# Patient Record
Sex: FEMALE | Race: WHITE
[De-identification: names, ages, dates, MRNs, and addresses within clinical notes are randomized per-mention and may not be internally consistent; named-entity substitution may affect disease eponyms.]

---
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
## 2019-01-29 ENCOUNTER — HOSPITAL ENCOUNTER (EMERGENCY)
Dept: HOSPITAL 74 - FER | Age: 19
Discharge: HOME | End: 2019-01-29
Payer: COMMERCIAL

## 2019-01-29 VITALS — TEMPERATURE: 98.6 F | HEART RATE: 75 BPM | SYSTOLIC BLOOD PRESSURE: 113 MMHG | DIASTOLIC BLOOD PRESSURE: 72 MMHG

## 2019-01-29 VITALS — BODY MASS INDEX: 37.8 KG/M2

## 2019-01-29 DIAGNOSIS — K52.9: Primary | ICD-10-CM

## 2019-01-29 LAB
ALBUMIN SERPL-MCNC: 4.1 G/DL (ref 3.4–5)
ALP SERPL-CCNC: 76 U/L (ref 45–117)
ALT SERPL-CCNC: 44 U/L (ref 13–61)
ANION GAP SERPL CALC-SCNC: 7 MMOL/L (ref 8–16)
APPEARANCE UR: (no result)
AST SERPL-CCNC: 24 U/L (ref 15–37)
BACTERIA #/AREA URNS HPF: (no result) /HPF
BASOPHILS # BLD: 0.9 % (ref 0–2)
BILIRUB SERPL-MCNC: 0.3 MG/DL (ref 0.2–1)
BUN SERPL-MCNC: 11 MG/DL (ref 7–18)
CALCIUM SERPL-MCNC: 9.3 MG/DL (ref 8.5–10)
CHLORIDE SERPL-SCNC: 105 MMOL/L (ref 98–107)
CO2 SERPL-SCNC: 25 MMOL/L (ref 21–32)
COLOR UR: (no result)
CREAT SERPL-MCNC: < 0.6 MG/DL (ref 0.55–1.3)
DEPRECATED RDW RBC AUTO: 12.7 % (ref 11.6–15.6)
EOSINOPHIL # BLD: 1.1 % (ref 0–4.5)
EPITH CASTS URNS QL MICRO: (no result) /HPF
GLUCOSE SERPL-MCNC: 101 MG/DL (ref 74–106)
HCT VFR BLD CALC: 42.4 % (ref 32.4–45.2)
HGB BLD-MCNC: 13.9 GM/DL (ref 10.7–15.3)
LEUKOCYTE ESTERASE UR QL STRIP.AUTO: (no result)
LYMPHOCYTES # BLD: 28 % (ref 8–40)
MCH RBC QN AUTO: 27.6 PG (ref 25.7–33.7)
MCHC RBC AUTO-ENTMCNC: 32.8 G/DL (ref 32–36)
MCV RBC: 84.3 FL (ref 80–96)
MONOCYTES # BLD AUTO: 8 % (ref 3.8–10.2)
NEUTROPHILS # BLD: 62 % (ref 42.8–82.8)
PH UR: 7 [PH] (ref 4.5–8)
PLATELET # BLD AUTO: 242 K/MM3 (ref 134–434)
PMV BLD: 9.5 FL (ref 7.5–11.1)
POTASSIUM SERPLBLD-SCNC: 3.9 MMOL/L (ref 3.5–5.1)
PROT SERPL-MCNC: 8 G/DL (ref 6.4–8.2)
RBC # BLD AUTO: (no result) /HPF (ref 0–3)
RBC # BLD AUTO: 5.03 M/MM3 (ref 3.6–5.2)
SODIUM SERPL-SCNC: 137 MMOL/L (ref 136–145)
SP GR UR: 1.02 (ref 1.01–1.03)
UROBILINOGEN UR STRIP-MCNC: 1 MG/DL (ref 0.2–1)
WBC # BLD AUTO: 8.8 K/MM3 (ref 4–10.8)
WBC # UR AUTO: (no result) /UL (ref 0–5)

## 2019-01-29 PROCEDURE — 3E0333Z INTRODUCTION OF ANTI-INFLAMMATORY INTO PERIPHERAL VEIN, PERCUTANEOUS APPROACH: ICD-10-PCS

## 2019-01-29 PROCEDURE — 3E0337Z INTRODUCTION OF ELECTROLYTIC AND WATER BALANCE SUBSTANCE INTO PERIPHERAL VEIN, PERCUTANEOUS APPROACH: ICD-10-PCS

## 2019-01-29 NOTE — PDOC
*Physical Exam





- Vital Signs


 Last Vital Signs











Temp Pulse Resp BP Pulse Ox


 


 98.6 F   75   18   113/72   97 


 


 01/29/19 18:04  01/29/19 18:04  01/29/19 18:04  01/29/19 18:04  01/29/19 18:04














ED Treatment Course





- LABORATORY


CBC & Chemistry Diagram: 


 01/29/19 18:45





 01/29/19 18:49





- ADDITIONAL ORDERS


Additional order review: 


 Laboratory  Results











  01/29/19 01/29/19 01/29/19





  18:49 18:49 18:35


 


Sodium   137 


 


Potassium   3.9 


 


Chloride   105 


 


Carbon Dioxide   25 


 


Anion Gap   7 L 


 


BUN   11 


 


Creatinine   < 0.6 


 


Creat Clearance w eGFR   > 60 


 


Random Glucose   101 


 


Calcium   9.3 


 


Total Bilirubin   0.3 


 


AST   24 


 


ALT   44 


 


Alkaline Phosphatase   76 


 


Total Protein   8.0 


 


Albumin   4.1 


 


Urine Color  Not  


 


Urine Appearance  Not  


 


Urine pH  7.0  


 


Ur Specific Gravity  1.025  


 


Urine Protein  Negative  


 


Urine Glucose (UA)  Negative  


 


Urine Ketones  Negative  


 


Urine Blood  Negative  


 


Urine Nitrite  Negative  


 


Urine Bilirubin  Negative  


 


Urine Urobilinogen  1.0  


 


Ur Leukocyte Esterase  1+ H  


 


Urine HCG, Qual    Negative








 











  01/29/19





  18:45


 


RBC  5.03


 


MCV  84.3


 


MCHC  32.8


 


RDW  12.7


 


MPV  9.5


 


Neutrophils %  62.0


 


Lymphocytes %  28.0


 


Monocytes %  8.0


 


Eosinophils %  1.1


 


Basophils %  0.9














Progress Note





- Progress Note


Progress Note: 





Care of this patient received from 


Laboratory evaluation is essentially normal.  Patient interviewed and reexamined

: She is comfortable now without any further abdominal pain.  She has no 

tenderness or masses on abdominal exam.





Patient will be discharged with instructions to continue fluids with light diet 

and advancement of diet as tolerated.  She can use Imodium/Kaopectate/Pepto-

Bismol as needed for diarrhea.  She should follow-up with her doctor within the 

next 2-3 days 





*DC/Admit/Observation/Transfer


Diagnosis at time of Disposition: 


 Gastroenteritis








- Discharge Dispostion


Disposition: HOME


Condition at time of disposition: Stable





- Referrals





- Patient Instructions


Printed Discharge Instructions:  Diarrhea


Additional Instructions: 


drink plenty of fluids


imodium/kaopectate as needed for diarrhea


Return to ER if you have vomiting/worsening pain or fever


Follow-up with your doctor within the next 48 hours





- Post Discharge Activity

## 2019-01-29 NOTE — PDOC
History of Present Illness





- General


History Source: Patient


Exam Limitations: No Limitations





- History of Present Illness


Initial Comments: 





01/29/19 18:49


The patient is a 19 year old female, with no significant past medical history, 

who presents to the emergency department with, 1 day of LLQ pain. As per 

patient prior to the onset of her symptoms she was experiencing nausea, vomiting

, diarrhea, and fever which has since resolved. Her LMP was two weeks ago.





She denies recent  headache or dizziness. She denies recent  dysuria, frequency

, urgency or hematuria. She denies recent chest pain or shortness of breath.





Allergies: NKDA 


Past surgical history: None reported.


Social history: Nonsmoker. Denies EtOH use and recreational drug use. 





<Alejandro Gusman - Last Filed: 01/29/19 18:49>





- General


History Source: Patient





<Jus Porras - Last Filed: 01/31/19 18:47>





- General


Chief Complaint: Pain


Stated Complaint: LLQ PAIN


Time Seen by Provider: 01/29/19 18:19





Past History





<Alejandro Gusman - Last Filed: 01/29/19 18:49>





- Travel


Traveled outside of the country in the last 30 days: No


Close contact w/someone who was outside of country & ill: No





- Past Medical History


COPD: No





- Suicide/Smoking/Psychosocial Hx


Smoking History: Never smoked


Have you smoked in the past 12 months: No


Hx Alcohol Use: No


Drug/Substance Use Hx: No


Substance Use Type: None





<Jus Porras - Last Filed: 01/31/19 18:47>





- Past Medical History


Allergies/Adverse Reactions: 


 Allergies











Allergy/AdvReac Type Severity Reaction Status Date / Time


 


No Known Allergies Allergy   Verified 01/29/19 18:05











Home Medications: 


Ambulatory Orders





NK [No Known Home Medication]  01/29/19 











**Review of Systems





- Review of Systems


Able to Perform ROS?: Yes


Constitutional: No: Symptoms Reported, See HPI, Chills, Diaphoresis, Fever, 

Loss of Appetite, Malaise, Night Sweats, Weakness, Weight Stable, Unintentional 

Wgt. Loss, Unexplained wgt Loss, Other


HEENTM: No: Symptoms Reported, See HPI, Eye Pain, Blurred Vision, Tearing, 

Recent change in vision, Double Vision, Cataracts, Ear Pain, Ocular Prothesis, 

Ear Discharge, Nose Pain, Nose Congestion, Tinnitus, Nose Bleeding, Hearing Loss

, Throat Pain, Throat Swelling, Mouth Pain, Dental Problems, Difficulty 

Swallowing, Mouth Swelling, Other


Respiratory: No: Symptoms reported, See HPI, Cough, Orthopnea, Shortness of 

Breath, SOB with Exertion, SOB at Rest, Stridor, Wheezing, Productive cough, 

Hemoptysis, Other


Cardiac (ROS): No: Symptoms Reported, See HPI, Chest Pain, Edema, Irregular 

Heart Rate, Lightheadedness, Palpitations, Syncope, Chest Tightness, Other


ABD/GI: Yes: Other (LLQ pain. )


: No: Symptoms Reported, See HPI, Burning, Dysuria, Discharge, Frequency, 

Flank Pain, Hematuria, Incontinence, Pain, Urgency, Testicular Mass, Testicular 

Swelling, Lesions, Testicular Pain, Other (No CVA tenderness. )


Musculoskeletal: No: Symptoms Reported, See HPI, Back Pain, Gout, Joint Pain, 

Joint Swelling, Muscle Pain, Muscle Weakness, Neck Pain, Joint Stiffness, Other


Integumentary: No: Symptoms Reported, See HPI, Bruising, Change in Color, 

Change in Hair/Nails, Dryness, Erythema, Flushing, Lesions, Lumps, Pallor, 

Pruritus, Rash, Sweating, Other


Neurological: No: Symptoms reported, See HPI, Headache, Numbness, Paresthesia, 

Pre-Existing Deficit, Seizure, Tingling, Tremors, Weakness, Unsteady Gait, 

Ataxia, Dizziness, Other


Psychiatric: No: Anxiety, Depression, Frequent Crying, Stressors, Sleep Pattern 

Change, Emotional Problems, Mood Swings, Change in Appetite, Other


Endocrine: No: Symptoms Reported, See HPI, Excessive Sweating, Flushing, 

Intolerance to Cold, Intolerance to Heat, Increased Hunger, Increased Thirst, 

Increased Urine, Unexplained Weight Gain, Unexplained Weight Loss, Change in 

Weight, Other


Hematologic/Lymphatic: No: Symptoms Reported, See HPI, Anemia, Blood Clots, 

Easy Bleeding, Easy Bruising, Bleeding Diathesis, Lymph Node Abnormalities, 

Swollen Glands, Other


All Other Systems: Reviewed and Negative





<Alejandro Gusman - Last Filed: 01/29/19 18:49>





*Physical Exam





- Vital Signs


 Last Vital Signs











Temp Pulse Resp BP Pulse Ox


 


 98.6 F   75   18   113/72   97 


 


 01/29/19 18:04  01/29/19 18:04  01/29/19 18:04  01/29/19 18:04  01/29/19 18:04














- Physical Exam


General Appearance: Yes: Nourished, Appropriately Dressed


HEENT: positive: Normal ENT Inspection, Normal Voice


Neck: positive: Supple


Respiratory/Chest: positive: Lungs Clear, Normal Breath Sounds


Cardiovascular: positive: Regular Rhythm, Regular Rate


Gastrointestinal/Abdominal: positive: Normal Bowel Sounds, Tender (LLQ with 

deep palpation. ), Soft, Tenderness.  negative: Organomegaly, Pulsatile Mass, 

Increased Bowel Sounds, Decreased BS, Distended, Guarding, Rebound, Hernia, Mass

, Hepatomegaly, Spleenomegaly


Musculoskeletal: positive: Normal Inspection.  negative: CVA Tenderness, CVA 

Tenderness (R), CVA Tenderness (L), Decreased Range of Motion, Muscle Spasm, 

Vertebral Tenderness, Other


Extremity: positive: Normal Inspection, Normal Range of Motion


Integumentary: positive: Normal Color, Dry, Warm


Neurologic: positive: CNs II-XII NML intact, Fully Oriented, Alert, Normal Mood/

Affect, Normal Response





<Alejandro Gusman - Last Filed: 01/29/19 18:49>





- Vital Signs


 Last Vital Signs











Temp Pulse Resp BP Pulse Ox


 


 98.6 F   75   18   113/72   97 


 


 01/29/19 18:04  01/29/19 18:04  01/29/19 18:04  01/29/19 18:04  01/29/19 18:04














<Jus Porras - Last Filed: 01/31/19 18:47>





Moderate Sedation





- Procedure Monitoring


Vital Signs: 


Procedure Monitoring Vital Signs











Temperature  98.6 F   01/29/19 18:04


 


Pulse Rate  75   01/29/19 18:04


 


Respiratory Rate  18   01/29/19 18:04


 


Blood Pressure  113/72   01/29/19 18:04


 


O2 Sat by Pulse Oximetry (%)  97   01/29/19 18:04











<Alejandro Gusman - Last Filed: 01/29/19 18:49>





- Procedure Monitoring


Vital Signs: 


Procedure Monitoring Vital Signs











Temperature  98.6 F   01/29/19 18:04


 


Pulse Rate  75   01/29/19 18:04


 


Respiratory Rate  18   01/29/19 18:04


 


Blood Pressure  113/72   01/29/19 18:04


 


O2 Sat by Pulse Oximetry (%)  97   01/29/19 18:04











<Jus Porras - Last Filed: 01/31/19 18:47>





ED Treatment Course





- LABORATORY


CBC & Chemistry Diagram: 


 01/29/19 18:45





 01/29/19 18:49





<Alejandro Gusman - Last Filed: 01/29/19 18:49>





- LABORATORY


CBC & Chemistry Diagram: 


 01/29/19 18:45





 01/29/19 18:49





<Jus Porras - Last Filed: 01/31/19 18:47>





*DC/Admit/Observation/Transfer





- Attestations


Scribe Attestion: 





01/29/19 18:54





Documentation prepared by Alejandro Gusman, acting as medical scribe for 

Jus Porras MD.





<Alejandro Gusman - Last Filed: 01/29/19 18:49>





<Jus Porras - Last Filed: 01/31/19 18:47>


Diagnosis at time of Disposition: 


 Gastroenteritis








- Discharge Dispostion


Disposition: HOME


Condition at time of disposition: Stable





- Patient Instructions


Printed Discharge Instructions:  Diarrhea


Additional Instructions: 


drink plenty of fluids


imodium/kaopectate as needed for diarrhea


Return to ER if you have vomiting/worsening pain or fever


Follow-up with your doctor within the next 48 hours

## 2023-07-29 ENCOUNTER — HOSPITAL ENCOUNTER (EMERGENCY)
Dept: HOSPITAL 74 - JER | Age: 23
Discharge: HOME | End: 2023-07-29
Payer: COMMERCIAL

## 2023-07-29 VITALS
TEMPERATURE: 99.1 F | SYSTOLIC BLOOD PRESSURE: 124 MMHG | HEART RATE: 94 BPM | DIASTOLIC BLOOD PRESSURE: 69 MMHG | RESPIRATION RATE: 18 BRPM

## 2023-07-29 VITALS — BODY MASS INDEX: 41.1 KG/M2

## 2023-07-29 DIAGNOSIS — S51.811A: Primary | ICD-10-CM
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
## 2023-07-29 PROCEDURE — 3E0233Z INTRODUCTION OF ANTI-INFLAMMATORY INTO MUSCLE, PERCUTANEOUS APPROACH: ICD-10-PCS

## 2023-07-29 PROCEDURE — 3E0234Z INTRODUCTION OF SERUM, TOXOID AND VACCINE INTO MUSCLE, PERCUTANEOUS APPROACH: ICD-10-PCS

## 2023-07-29 PROCEDURE — 0HQDXZZ REPAIR RIGHT LOWER ARM SKIN, EXTERNAL APPROACH: ICD-10-PCS

## 2023-08-09 ENCOUNTER — HOSPITAL ENCOUNTER (EMERGENCY)
Dept: HOSPITAL 74 - JERFT | Age: 23
Discharge: HOME | End: 2023-08-09
Payer: COMMERCIAL

## 2023-08-09 VITALS
TEMPERATURE: 98.6 F | HEART RATE: 101 BPM | DIASTOLIC BLOOD PRESSURE: 73 MMHG | RESPIRATION RATE: 18 BRPM | SYSTOLIC BLOOD PRESSURE: 126 MMHG

## 2023-08-09 VITALS — BODY MASS INDEX: 39.4 KG/M2

## 2023-08-09 DIAGNOSIS — Z48.02: Primary | ICD-10-CM
